# Patient Record
Sex: MALE | Race: WHITE | NOT HISPANIC OR LATINO | URBAN - METROPOLITAN AREA
[De-identification: names, ages, dates, MRNs, and addresses within clinical notes are randomized per-mention and may not be internally consistent; named-entity substitution may affect disease eponyms.]

---

## 2019-01-20 ENCOUNTER — EMERGENCY (EMERGENCY)
Facility: HOSPITAL | Age: 29
LOS: 1 days | Discharge: HOME | End: 2019-01-20
Attending: EMERGENCY MEDICINE
Payer: MEDICAID

## 2019-01-20 VITALS
TEMPERATURE: 98 F | DIASTOLIC BLOOD PRESSURE: 92 MMHG | RESPIRATION RATE: 18 BRPM | SYSTOLIC BLOOD PRESSURE: 150 MMHG | HEART RATE: 106 BPM | OXYGEN SATURATION: 100 %

## 2019-01-20 PROCEDURE — 90715 TDAP VACCINE 7 YRS/> IM: CPT

## 2019-01-20 PROCEDURE — 99283 EMERGENCY DEPT VISIT LOW MDM: CPT | Mod: 25

## 2019-01-20 PROCEDURE — 90471 IMMUNIZATION ADMIN: CPT

## 2019-01-20 PROCEDURE — 99282 EMERGENCY DEPT VISIT SF MDM: CPT

## 2019-01-20 RX ORDER — TETANUS TOXOID, REDUCED DIPHTHERIA TOXOID AND ACELLULAR PERTUSSIS VACCINE, ADSORBED 5; 2.5; 8; 8; 2.5 [IU]/.5ML; [IU]/.5ML; UG/.5ML; UG/.5ML; UG/.5ML
0.5 SUSPENSION INTRAMUSCULAR ONCE
Qty: 0 | Refills: 0 | Status: COMPLETED | OUTPATIENT
Start: 2019-01-20 | End: 2019-01-20

## 2019-01-20 RX ADMIN — TETANUS TOXOID, REDUCED DIPHTHERIA TOXOID AND ACELLULAR PERTUSSIS VACCINE, ADSORBED 0.5 MILLILITER(S): 5; 2.5; 8; 8; 2.5 SUSPENSION INTRAMUSCULAR at 16:57

## 2019-01-20 NOTE — ED PROVIDER NOTE - NSFOLLOWUPINSTRUCTIONS_ED_ALL_ED_FT
You may take 600mg of ibuprofen (example: motrin or advil) every 6 hours for baseline pain control as indicated with respect to the warnings on the label. This is an over the counter medication. You may take 1000mg of Tylenol every 6 hours for baseline pain control with respect to the warnings on the label. Please follow up with a primary care provider as soon as possible. If you do not have a primary care doctor, you can make an appointment with one at the following location.   [facility] at [place]  Phone: [phone]  Fax: [phone]  Address: [address] [place] [address], [facility] [address], [place] [address]    Additionally, you may use the following web address to find a [facility] physician close to you: [email or web address]     Please return to the emergency department if you experience worsening symptoms, or if you develop headache, neck stiffness, fever/chills, changes in vision, chest pain, shortness of breath, difficulty breathing, palpitations, lightheadedness, weakness, dizziness, numbness, tingling, abdominal pain, nausea, vomiting, diarrhea, changes in your urine, blood in the urine, painful urination, syncope (passing out), or for any other concerns.

## 2019-01-20 NOTE — ED PROVIDER NOTE - SKIN, MLM
Skin normal color for race, warm, dry. Small patch of skin missing above R thumb nail - no active bleeding.

## 2019-01-20 NOTE — ED PROVIDER NOTE - ATTENDING CONTRIBUTION TO CARE
I performed a history and physical exam of the patient and discussed their management with the resident/ACP. I reviewed the resident/ACP's note and agree with the documented findings and plan of care.  attn = see MDM

## 2019-01-20 NOTE — ED ADULT NURSE NOTE - OBJECTIVE STATEMENT
28 year old male A&OX4 presents with right thumb laceration. Bleeding controlled with direct pressure. Patient's tetanus is not up to date.

## 2019-01-20 NOTE — ED PROVIDER NOTE - OBJECTIVE STATEMENT
27 yo M, accompanied by friend, w/ PMH of psoriasis on Triamcinolone, presenting w/ chief complaint of R thumb laceration sustained through cheese grating accident, approximately 1 hour prior to arrival. Wound cleaned and bleeding controlled prior to arrival. Patient has full sensation, and denies pain. Denies headache, neck stiffness, fever/chills, vision change, chest pain, shortness of breath, difficulty breathing, palpitations, lightheadedness, weakness, dizziness, numbness, tingling, abdominal pain, nausea, vomiting, diarrhea, dysuria, hematuria, syncope. 29 yo M, accompanied by friend, w/ PMH of psoriasis on Triamcinolone, presenting w/ chief complaint of R thumb laceration sustained through cheese grating accident, approximately 1 hour prior to arrival. Wound cleaned and bleeding controlled prior to arrival. Patient has full sensation, and denies pain. Denies headache, neck stiffness, fever/chills, vision change, chest pain, shortness of breath, difficulty breathing, palpitations, lightheadedness, weakness, dizziness, numbness, tingling, abdominal pain, nausea, vomiting, diarrhea, dysuria, hematuria, syncope.       Attending note. Patient was seen in the fast track room #5. Agree with the above. Patient sustained injury to the tip of the right thumb when using a cheese grater this afternoon. Patient is unsure if his last tetanus.

## 2019-01-20 NOTE — ED PROVIDER NOTE - PHYSICAL EXAMINATION
Attending note. Patient is alert and in no acute distress. Examination of the patient's right hand reveals a small one half centimeter in diameter avulsion from the tip of the right thumb. There are no other injuries.

## 2019-01-20 NOTE — ED PROVIDER NOTE - MEDICAL DECISION MAKING DETAILS
27 yo M w/ thumb laceration. No recent tetanus. Wound no longer bleeding. Small chunk of missing tissue. Unable to be sutured. Will clean and wrap thumb with surgicel, and update tetanus. Treat pain. Reassess. 27 yo M w/ thumb laceration. No recent tetanus. Wound no longer bleeding. Small chunk of missing tissue. Unable to be sutured. Will clean and wrap thumb with surgicel, and update tetanus. Treat pain. Reassess.      Attending note. Small skin avulsion from the tip of the right thumb secondary to a cheese grater. Surgicel and bandage. Tetanus prophylaxis.

## 2019-01-20 NOTE — ED ADULT NURSE NOTE - NSIMPLEMENTINTERV_GEN_ALL_ED
Implemented All Universal Safety Interventions:  [place] to call system. Call bell, personal items and telephone within reach. Instruct patient to call for assistance. Room bathroom lighting operational. Non-slip footwear when patient is off stretcher. Physically safe environment: no spills, clutter or unnecessary equipment. Stretcher in lowest position, wheels locked, appropriate side rails in place.

## 2021-03-08 ENCOUNTER — FORM ENCOUNTER (OUTPATIENT)
Age: 31
End: 2021-03-08

## 2021-03-09 ENCOUNTER — TRANSCRIPTION ENCOUNTER (OUTPATIENT)
Age: 31
End: 2021-03-09

## 2021-03-09 PROBLEM — L40.9 PSORIASIS, UNSPECIFIED: Chronic | Status: ACTIVE | Noted: 2019-01-20

## 2021-03-09 PROBLEM — Z00.00 ENCOUNTER FOR PREVENTIVE HEALTH EXAMINATION: Status: ACTIVE | Noted: 2021-03-09

## 2021-03-10 ENCOUNTER — OUTPATIENT (OUTPATIENT)
Dept: OUTPATIENT SERVICES | Facility: HOSPITAL | Age: 31
LOS: 1 days | End: 2021-03-10
Payer: COMMERCIAL

## 2021-03-10 ENCOUNTER — APPOINTMENT (OUTPATIENT)
Dept: DISASTER EMERGENCY | Facility: HOSPITAL | Age: 31
End: 2021-03-10

## 2021-03-10 VITALS
SYSTOLIC BLOOD PRESSURE: 109 MMHG | RESPIRATION RATE: 18 BRPM | OXYGEN SATURATION: 97 % | TEMPERATURE: 99 F | HEART RATE: 78 BPM | DIASTOLIC BLOOD PRESSURE: 74 MMHG

## 2021-03-10 VITALS
SYSTOLIC BLOOD PRESSURE: 141 MMHG | OXYGEN SATURATION: 96 % | WEIGHT: 271.17 LBS | DIASTOLIC BLOOD PRESSURE: 88 MMHG | HEIGHT: 71 IN | TEMPERATURE: 99 F | HEART RATE: 88 BPM | RESPIRATION RATE: 18 BRPM

## 2021-03-10 DIAGNOSIS — U07.1 COVID-19: ICD-10-CM

## 2021-03-10 PROCEDURE — M0239: CPT

## 2021-03-10 RX ORDER — BAMLANIVIMAB 35 MG/ML
700 INJECTION, SOLUTION INTRAVENOUS ONCE
Refills: 0 | Status: COMPLETED | OUTPATIENT
Start: 2021-03-10 | End: 2021-03-10

## 2021-03-10 RX ORDER — SODIUM CHLORIDE 9 MG/ML
250 INJECTION INTRAMUSCULAR; INTRAVENOUS; SUBCUTANEOUS
Refills: 0 | Status: COMPLETED | OUTPATIENT
Start: 2021-03-10 | End: 2021-03-10

## 2021-03-10 RX ADMIN — SODIUM CHLORIDE 25 MILLILITER(S): 9 INJECTION INTRAMUSCULAR; INTRAVENOUS; SUBCUTANEOUS at 11:47

## 2021-03-10 RX ADMIN — BAMLANIVIMAB 270 MILLIGRAM(S): 35 INJECTION, SOLUTION INTRAVENOUS at 10:55

## 2021-03-10 NOTE — MONOCLONAL ANTIBODY INFUSION - ASSESSMENT AND PLAN
31 y/o male w/ a hx of a BMI >35  who came in for monoclonal antibody infusion for being tested positive for COVID 19 on 3/8/21. The pt states he has been experiencing __________. He denies any CP, SOB, chills, numbness/tingling in b/l limbs, loss of sensation or motor function, n/v/d  Pt was referred by  __________    PLAN:  - infusion procedure explained to patient   -Consent for monoclonal antibody infusion obtained   - Risk & benifits discussed/all questions answered  -infuse  Bamlanivimab 700mg  IV over one hour   -observe patient for one hour post infusion    I have reviewed the Bamlanivimab Emergency Use Authorization (EUA) and I have provided the patient or patient's caregiver with the following information:      1. FDA has authorized emergency use Bamlanivimab, which is not an FDA-approved biological product.  2. The patient or patient's caregiver has the option to accept or refuse administration of Bamlanivimab.   3. The significant known and potential risks and benefits of Bamlanivimab and the extent to which such risks and benefits are unknown.  4. Information on available alternative treatments and risks and benefits of those alternatives.    The patient's COVID monoclonal antibody infusion went well without any complications. The patient tolerated the infusion without any reactions. His vitals were stable throughout the infusion. The pt denies any CP, fevers, chills, SOB, numbness/tingling in b/l limbs, loss of sensation or motor function, n/v/d while receiving the transfusion. He denies any symptoms an hour after post infusion. His vitals were taken post transfusion and were stable. The patient was discharged at _____ on 03/10/21. Pt is medically cleared to be discharged home. Discharge instructions were provided to the patient with a fact sheet included. Patient was instructed to self-isolate and use infection control measures (e.g wear mask, isolate, social distance, avoid sharing personal items, clean and disinfect "high touch" surfaces, and frequent handwashing according to the CDC guidelines. The patient was informed on what symptoms to be aware of for the next couple of days, and if there are any issues to call the 24/7 clinical call center. Patient was instructed to follow up with his PCP as needed.   29 y/o male w/ a hx of a BMI >35  who came in for monoclonal antibody infusion for being tested positive for COVID 19 on 3/8/21. The pt states he has been experiencing fever, cough and HA. He denies any CP, SOB, chills, numbness/tingling in b/l limbs, loss of sensation or motor function, n/v/d  Pt was referred by Dr. Flores.    PLAN:  - infusion procedure explained to patient   -Consent for monoclonal antibody infusion obtained   - Risk & benefits discussed/all questions answered  -infuse  Bamlanivimab 700mg  IV over one hour   -observe patient for one hour post infusion    I have reviewed the Bamlanivimab Emergency Use Authorization (EUA) and I have provided the patient or patient's caregiver with the following information:      1. FDA has authorized emergency use Bamlanivimab, which is not an FDA-approved biological product.  2. The patient or patient's caregiver has the option to accept or refuse administration of Bamlanivimab.   3. The significant known and potential risks and benefits of Bamlanivimab and the extent to which such risks and benefits are unknown.  4. Information on available alternative treatments and risks and benefits of those alternatives.    The patient's COVID monoclonal antibody infusion went well without any complications. The patient tolerated the infusion without any reactions. His vitals were stable throughout the infusion. The pt denies any CP, fevers, chills, SOB, numbness/tingling in b/l limbs, loss of sensation or motor function, n/v/d while receiving the transfusion. He denies any symptoms an hour after post infusion. His vitals were taken post transfusion and were stable. The patient was discharged at _____ on 03/10/21. Pt is medically cleared to be discharged home. Discharge instructions were provided to the patient with a fact sheet included. Patient was instructed to self-isolate and use infection control measures (e.g wear mask, isolate, social distance, avoid sharing personal items, clean and disinfect "high touch" surfaces, and frequent handwashing according to the CDC guidelines. The patient was informed on what symptoms to be aware of for the next couple of days, and if there are any issues to call the 24/7 clinical call center. Patient was instructed to follow up with his PCP as needed.   29 y/o male w/ a hx of a BMI >35  who came in for monoclonal antibody infusion for being tested positive for COVID 19 on 3/8/21. The pt states he has been experiencing fever, cough and HA. He denies any CP, SOB, chills, numbness/tingling in b/l limbs, loss of sensation or motor function, n/v/d  Pt was referred by Dr. Folres.    PLAN:  - infusion procedure explained to patient   -Consent for monoclonal antibody infusion obtained   - Risk & benefits discussed/all questions answered  -infuse  Bamlanivimab 700mg  IV over one hour   -observe patient for one hour post infusion    I have reviewed the Bamlanivimab Emergency Use Authorization (EUA) and I have provided the patient or patient's caregiver with the following information:      1. FDA has authorized emergency use Bamlanivimab, which is not an FDA-approved biological product.  2. The patient or patient's caregiver has the option to accept or refuse administration of Bamlanivimab.   3. The significant known and potential risks and benefits of Bamlanivimab and the extent to which such risks and benefits are unknown.  4. Information on available alternative treatments and risks and benefits of those alternatives.    The patient's COVID monoclonal antibody infusion went well without any complications. The patient tolerated the infusion without any reactions. His vitals were stable throughout the infusion. The pt denies any CP, fevers, chills, SOB, numbness/tingling in b/l limbs, loss of sensation or motor function, n/v/d while receiving the transfusion. He denies any symptoms an hour after post infusion. His vitals were taken post transfusion and were stable. The patient was discharged at 12:50pm on 03/10/21. Pt is medically cleared to be discharged home. Discharge instructions were provided to the patient with a fact sheet included. Patient was instructed to self-isolate and use infection control measures (e.g wear mask, isolate, social distance, avoid sharing personal items, clean and disinfect "high touch" surfaces, and frequent handwashing according to the CDC guidelines. The patient was informed on what symptoms to be aware of for the next couple of days, and if there are any issues to call the 24/7 clinical call center. Patient was instructed to follow up with his PCP as needed.

## 2021-03-10 NOTE — MONOCLONAL ANTIBODY INFUSION - EXAM
Physical Exam:    Appearance: NAD	  HEENT: Normal oral mucosa  Cardiovascular: Normal S1 S2, No JVD, No edema  Respiratory: B/L symmetrical chest rise, Lungs clear to auscultation, non-labored breathing  Gastrointestinal:  Soft, Non-tender, + BS	  Skin: warm and dry  Neurologic: Non-focal  Extremities: Normal range of motion

## 2021-03-11 ENCOUNTER — TRANSCRIPTION ENCOUNTER (OUTPATIENT)
Age: 31
End: 2021-03-11

## 2023-03-21 NOTE — ED ADULT NURSE NOTE - CHIEF COMPLAINT QUOTE
Right Thumb laceration from  Clindamycin Pregnancy And Lactation Text: This medication can be used in pregnancy if certain situations. Clindamycin is also present in breast milk.